# Patient Record
Sex: MALE | Race: WHITE | NOT HISPANIC OR LATINO
[De-identification: names, ages, dates, MRNs, and addresses within clinical notes are randomized per-mention and may not be internally consistent; named-entity substitution may affect disease eponyms.]

---

## 2022-11-18 PROBLEM — Z00.00 ENCOUNTER FOR PREVENTIVE HEALTH EXAMINATION: Status: ACTIVE | Noted: 2022-11-18

## 2022-11-29 ENCOUNTER — NON-APPOINTMENT (OUTPATIENT)
Age: 67
End: 2022-11-29

## 2022-11-29 ENCOUNTER — TRANSCRIPTION ENCOUNTER (OUTPATIENT)
Age: 67
End: 2022-11-29

## 2022-11-29 ENCOUNTER — APPOINTMENT (OUTPATIENT)
Dept: SURGERY | Facility: CLINIC | Age: 67
End: 2022-11-29

## 2022-11-29 VITALS
OXYGEN SATURATION: 99 % | DIASTOLIC BLOOD PRESSURE: 86 MMHG | RESPIRATION RATE: 16 BRPM | TEMPERATURE: 97.1 F | HEART RATE: 67 BPM | SYSTOLIC BLOOD PRESSURE: 139 MMHG

## 2022-11-29 VITALS — BODY MASS INDEX: 26.49 KG/M2 | HEIGHT: 65 IN | WEIGHT: 159 LBS

## 2022-11-29 DIAGNOSIS — I10 ESSENTIAL (PRIMARY) HYPERTENSION: ICD-10-CM

## 2022-11-29 DIAGNOSIS — Z85.46 PERSONAL HISTORY OF MALIGNANT NEOPLASM OF PROSTATE: ICD-10-CM

## 2022-11-29 DIAGNOSIS — E78.5 HYPERLIPIDEMIA, UNSPECIFIED: ICD-10-CM

## 2022-11-29 DIAGNOSIS — K21.9 GASTRO-ESOPHAGEAL REFLUX DISEASE W/OUT ESOPHAGITIS: ICD-10-CM

## 2022-11-29 DIAGNOSIS — I73.00 RAYNAUD'S SYNDROME W/OUT GANGRENE: ICD-10-CM

## 2022-11-29 DIAGNOSIS — K40.20 BILATERAL INGUINAL HERNIA, W/OUT OBSTRUCTION OR GANGRENE, NOT SPECIFIED AS RECURRENT: ICD-10-CM

## 2022-11-29 PROCEDURE — 99204 OFFICE O/P NEW MOD 45 MIN: CPT

## 2022-11-29 RX ORDER — ROSUVASTATIN CALCIUM 20 MG/1
20 TABLET, FILM COATED ORAL
Refills: 0 | Status: ACTIVE | COMMUNITY

## 2022-11-29 RX ORDER — MONTELUKAST 10 MG/1
10 TABLET, FILM COATED ORAL
Refills: 0 | Status: ACTIVE | COMMUNITY

## 2022-11-29 RX ORDER — RABEPRAZOLE SODIUM 20 MG/1
20 TABLET, DELAYED RELEASE ORAL
Refills: 0 | Status: ACTIVE | COMMUNITY

## 2022-11-29 RX ORDER — QUINIDINE SULFATE 200 MG
TABLET ORAL
Refills: 0 | Status: ACTIVE | COMMUNITY

## 2022-11-29 RX ORDER — LEVOCETIRIZINE DIHYDROCHLORIDE 5 MG/1
5 TABLET ORAL
Refills: 0 | Status: ACTIVE | COMMUNITY

## 2022-11-29 RX ORDER — AMLODIPINE BESYLATE AND BENAZEPRIL HYDROCHLORIDE 10; 20 MG/1; MG/1
10-20 CAPSULE ORAL
Refills: 0 | Status: ACTIVE | COMMUNITY

## 2022-11-29 NOTE — PHYSICAL EXAM
[Normal Breath Sounds] : Normal breath sounds [Normal Heart Sounds] : normal heart sounds [No Rash or Lesion] : No rash or lesion [de-identified] : healthy appearing [de-identified] : SAMSON KIMBALL [de-identified] : Supple [de-identified] : Bilateral groin bulges reducible on exam.  Right greater than left. [de-identified] : Genitalia appropriate for age and gender, 2 testicles in scrotal sac.

## 2022-11-29 NOTE — PLAN
[FreeTextEntry1] : Patient was counseled on bilateral open inguinal hernia repair without mesh using the Shouldice technique.  Risks benefits and complications were discussed with him at length.  Questions were answered to his satisfaction.  And he is agreeable to proceed.

## 2023-01-03 ENCOUNTER — NON-APPOINTMENT (OUTPATIENT)
Age: 68
End: 2023-01-03

## 2023-01-16 ENCOUNTER — TRANSCRIPTION ENCOUNTER (OUTPATIENT)
Age: 68
End: 2023-01-16

## 2023-01-16 LAB — SARS-COV-2 N GENE NPH QL NAA+PROBE: NOT DETECTED

## 2023-01-17 ENCOUNTER — TRANSCRIPTION ENCOUNTER (OUTPATIENT)
Age: 68
End: 2023-01-17

## 2023-01-17 ENCOUNTER — APPOINTMENT (OUTPATIENT)
Dept: SURGERY | Facility: HOSPITAL | Age: 68
End: 2023-01-17

## 2023-01-17 ENCOUNTER — OUTPATIENT (OUTPATIENT)
Dept: OUTPATIENT SERVICES | Facility: HOSPITAL | Age: 68
LOS: 1 days | End: 2023-01-17
Payer: COMMERCIAL

## 2023-01-17 VITALS
HEART RATE: 62 BPM | DIASTOLIC BLOOD PRESSURE: 80 MMHG | OXYGEN SATURATION: 98 % | TEMPERATURE: 98 F | RESPIRATION RATE: 15 BRPM | HEIGHT: 68 IN | WEIGHT: 145.28 LBS | SYSTOLIC BLOOD PRESSURE: 142 MMHG

## 2023-01-17 VITALS
SYSTOLIC BLOOD PRESSURE: 132 MMHG | HEART RATE: 69 BPM | RESPIRATION RATE: 16 BRPM | TEMPERATURE: 99 F | OXYGEN SATURATION: 97 % | DIASTOLIC BLOOD PRESSURE: 75 MMHG

## 2023-01-17 DIAGNOSIS — K40.21 BILATERAL INGUINAL HERNIA, WITHOUT OBSTRUCTION OR GANGRENE, RECURRENT: ICD-10-CM

## 2023-01-17 DIAGNOSIS — Z98.890 OTHER SPECIFIED POSTPROCEDURAL STATES: Chronic | ICD-10-CM

## 2023-01-17 PROCEDURE — 49505 PRP I/HERN INIT REDUC >5 YR: CPT | Mod: 50

## 2023-01-17 PROCEDURE — 49507 PRP I/HERN INIT BLOCK >5 YR: CPT | Mod: 50

## 2023-01-17 RX ORDER — HYDROMORPHONE HYDROCHLORIDE 2 MG/ML
1 INJECTION INTRAMUSCULAR; INTRAVENOUS; SUBCUTANEOUS
Refills: 0 | Status: DISCONTINUED | OUTPATIENT
Start: 2023-01-17 | End: 2023-01-17

## 2023-01-17 RX ORDER — ONDANSETRON 8 MG/1
4 TABLET, FILM COATED ORAL ONCE
Refills: 0 | Status: DISCONTINUED | OUTPATIENT
Start: 2023-01-17 | End: 2023-01-17

## 2023-01-17 RX ORDER — SODIUM CHLORIDE 9 MG/ML
1000 INJECTION, SOLUTION INTRAVENOUS
Refills: 0 | Status: DISCONTINUED | OUTPATIENT
Start: 2023-01-17 | End: 2023-01-17

## 2023-01-17 RX ORDER — HYDROMORPHONE HYDROCHLORIDE 2 MG/ML
0.5 INJECTION INTRAMUSCULAR; INTRAVENOUS; SUBCUTANEOUS
Refills: 0 | Status: DISCONTINUED | OUTPATIENT
Start: 2023-01-17 | End: 2023-01-17

## 2023-01-17 RX ORDER — OXYCODONE HYDROCHLORIDE 5 MG/1
1 TABLET ORAL
Qty: 6 | Refills: 0
Start: 2023-01-17

## 2023-01-17 RX ADMIN — SODIUM CHLORIDE 50 MILLILITER(S): 9 INJECTION, SOLUTION INTRAVENOUS at 09:59

## 2023-01-17 NOTE — BRIEF OPERATIVE NOTE - NSICDXBRIEFPREOP_GEN_ALL_CORE_FT
PRE-OP DIAGNOSIS:  Bilateral inguinal hernia without obstruction or gangrene 17-Jan-2023 14:54:17  Rosa Hogue

## 2023-01-17 NOTE — ASU PATIENT PROFILE, ADULT - NSICDXPASTMEDICALHX_GEN_ALL_CORE_FT
PAST MEDICAL HISTORY:  Bilateral inguinal hernia, without obstruction or gangrene, recurrent     GERD (gastroesophageal reflux disease)     HLD (hyperlipidemia)     HTN (hypertension)

## 2023-01-17 NOTE — BRIEF OPERATIVE NOTE - NSICDXBRIEFPROCEDURE_GEN_ALL_CORE_FT
PROCEDURES:  Repair, hernia, inguinal, reducible, bilateral, without history of prior repair, age older than 5 years 17-Jan-2023 14:54:41  Rosa Hogue

## 2023-01-17 NOTE — ASU PATIENT PROFILE, ADULT - FALL HARM RISK - HARM RISK INTERVENTIONS

## 2023-01-17 NOTE — BRIEF OPERATIVE NOTE - NSICDXBRIEFPOSTOP_GEN_ALL_CORE_FT
POST-OP DIAGNOSIS:  Bilateral inguinal hernia without obstruction or gangrene 17-Jan-2023 14:54:21  Rosa Hogue

## 2023-01-17 NOTE — ASU DISCHARGE PLAN (ADULT/PEDIATRIC) - NS MD DC FALL RISK RISK
For information on Fall & Injury Prevention, visit: [email or web address] OR  [email or web address] OR  https://www.cdc.gov/steadi/patient.html

## 2023-01-17 NOTE — ASU DISCHARGE PLAN (ADULT/PEDIATRIC) - CARE PROVIDER_API CALL
Anuradha Lofton)  Surgery  [address] [place] [address]  Phone: [phone]  Fax: [phone]  Follow Up Time: 2 weeks

## 2023-01-17 NOTE — ASU DISCHARGE PLAN (ADULT/PEDIATRIC) - ASU DC SPECIAL INSTRUCTIONSFT
Post-operative Instructions  	Wound dressing- You have a transparent/purple liquid dressing (called Dermabond) over your surgical incision. Do NOT remove the Dermabond. IN a few days, the Dermabond will fall off (or peel off) on its own.    	Showering/Bathing- You may shower over the dressing the very next day after surgery.  Allow the water to run over the dressing, but do not scrub the area.  Do not sit in a bathtub or swimming pool for at least one week after surgery.    	Activity level- You may resume most normal daily activity as tolerated, but avoid strenuous activities such as aerobics, jogging, exercising or heavy lifting for at least 1 week after surgery.  You may return to work in 1-2 days after surgery. Walking as tolerated.     	Pain Medication- Please take tylenol every 6 hours, and stagger with ibuprofen every 6 hours that you take in a pain medication every 3 hours. This will allow you to alternate medications for more consistent pain control. For example: take a dose of tylenol at 8 am, then take a dose of ibuprofen at 11 am, then take a dose of tylenol at 2pm, and continue as needed. You may take the prescribed pain medicine as needed for breakthrough pain.     	Follow-up Appointment- Please call the office to schedule your post-operative appointment which should be approximately 10 days after your surgery.     	Bruising/Bleeding/Swelling- It is normal for there to be some bruising and swelling at the site. Some discomfort at the surgical site is expected.  If your symptoms seem excessive, or if you have any question or concerns, please call the office.      Anesthesia Precautions:  For the next 12 hours do not:   •	drive a car,  •	drink alcohol, beer, or wine,   •	make important personal or business decisions  Diet:   •	Progress diet slowly unless otherwise indicated    Physician Notification  •	Any Prescription issues  •	Bleeding that does not stop  •	Persistent nausea and vomiting  •	Pain not relieved by medications  •	Fever greater than 101®F  •	Inability to tolerate liquids or foods  •	Unable to urinate after 8 hours    Follow Up Care:  •	In the event that you develop a complication and you are unable to reach your own physician, you may contact:  [phone] or go to the nearest Emergency Room.   •	Please call your surgeon to schedule your follow up appointment

## 2023-01-23 PROBLEM — K40.21 BILATERAL INGUINAL HERNIA, WITHOUT OBSTRUCTION OR GANGRENE, RECURRENT: Chronic | Status: ACTIVE | Noted: 2022-12-28

## 2023-01-23 PROBLEM — K21.9 GASTRO-ESOPHAGEAL REFLUX DISEASE WITHOUT ESOPHAGITIS: Chronic | Status: ACTIVE | Noted: 2022-12-28

## 2023-01-23 PROBLEM — E78.5 HYPERLIPIDEMIA, UNSPECIFIED: Chronic | Status: ACTIVE | Noted: 2022-12-28

## 2023-01-23 PROBLEM — I10 ESSENTIAL (PRIMARY) HYPERTENSION: Chronic | Status: ACTIVE | Noted: 2022-12-28

## 2023-02-01 ENCOUNTER — APPOINTMENT (OUTPATIENT)
Dept: SURGERY | Facility: CLINIC | Age: 68
End: 2023-02-01
Payer: COMMERCIAL

## 2023-02-01 DIAGNOSIS — Z09 ENCOUNTER FOR FOLLOW-UP EXAMINATION AFTER COMPLETED TREATMENT FOR CONDITIONS OTHER THAN MALIGNANT NEOPLASM: ICD-10-CM

## 2023-02-01 PROCEDURE — 99024 POSTOP FOLLOW-UP VISIT: CPT

## 2023-03-13 PROBLEM — Z09 S/P BILATERAL INGUINAL HERNIA REPAIR, FOLLOW-UP EXAM: Status: ACTIVE | Noted: 2023-03-13

## 2023-03-13 NOTE — REVIEW OF SYSTEMS
[Negative] : Heme/Lymph [FreeTextEntry7] : Status post open bilateral inguinal hernia repair Shouldice technique doing well.

## 2023-03-13 NOTE — PHYSICAL EXAM
[de-identified] : Patient demonstrates bilateral groin scars healing nicely without any obvious signs of infection.  Bilateral healing ridge are present.  Patient reports adequate sensation bilaterally, with some appropriate decrease in sensation which is appropriate with postop findings. [de-identified] : Genitalia appear to be appropriate for postop findings.

## 2023-03-13 NOTE — PLAN
[FreeTextEntry1] : 67-year-old male status post open bilateral inguinal hernia repair Shouldice technique doing well.  Recommended to increase his level of activity in an incremental fashion.  He has been reassured that both repairs appear to be structurally sound.  He was reassured that the healing ridge will flatten out in approximately 2- 4 months.  And a lot of his sensation will definitely come back if not 100%.  Follow-up in approximately 4 to 6 months.

## 2023-03-13 NOTE — REASON FOR VISIT
[Home] : at home, [unfilled] , at the time of the visit. [Medical Office: ([facility])___] : at the medical office located in  [Patient] : the patient [Self] : self [Post Op: _________] : a [unfilled] post op visit [FreeTextEntry1] : Status post open bilateral inguinal hernia repair Shouldice mesh free doing well.

## 2025-04-09 ENCOUNTER — APPOINTMENT (OUTPATIENT)
Dept: SURGERY | Facility: CLINIC | Age: 70
End: 2025-04-09
Payer: MEDICARE

## 2025-04-09 DIAGNOSIS — Z09 ENCOUNTER FOR FOLLOW-UP EXAMINATION AFTER COMPLETED TREATMENT FOR CONDITIONS OTHER THAN MALIGNANT NEOPLASM: ICD-10-CM

## 2025-04-09 DIAGNOSIS — Z87.19 ENCOUNTER FOR FOLLOW-UP EXAMINATION AFTER COMPLETED TREATMENT FOR CONDITIONS OTHER THAN MALIGNANT NEOPLASM: ICD-10-CM

## 2025-04-09 DIAGNOSIS — L02.92 FURUNCLE, UNSPECIFIED: ICD-10-CM

## 2025-04-09 DIAGNOSIS — L02.93 FURUNCLE, UNSPECIFIED: ICD-10-CM

## 2025-04-09 PROCEDURE — 99205 OFFICE O/P NEW HI 60 MIN: CPT

## (undated) DEVICE — POSITIONER FOAM HEAD CRADLE (PINK)

## (undated) DEVICE — SPECIMEN CONTAINER 100ML

## (undated) DEVICE — DRSG DERMABOND 0.7ML

## (undated) DEVICE — GLV 6.5 PROTEXIS (WHITE)

## (undated) DEVICE — WARMING BLANKET UPPER ADULT

## (undated) DEVICE — ELCTR BOVIE PENCIL SMOKE EVACUATION

## (undated) DEVICE — DRAIN PENROSE .25" X 12" SILICONE

## (undated) DEVICE — SUT SURGIPRO 2-0 30" V-20

## (undated) DEVICE — SUT POLYSORB 3-0 30" V-20 UNDYED

## (undated) DEVICE — DRAPE LIGHT HANDLE COVER (GREEN)

## (undated) DEVICE — SUT CHROMIC 2-0 54" REEL

## (undated) DEVICE — SOL IRR POUR H2O 1500ML

## (undated) DEVICE — ELCTR STRYKER NEPTUNE SMOKE EVACUATION PENCIL (GREEN)

## (undated) DEVICE — GLV 7.5 ULTRAFREE MAX

## (undated) DEVICE — SUT MONOCRYL 4-0 27" PS-2 UNDYED

## (undated) DEVICE — PREP CHLORAPREP HI-LITE ORANGE 26ML

## (undated) DEVICE — CANISTER SUCTION LID GUARD 3000CC

## (undated) DEVICE — SUT POLYSORB 2-0 18" V-20

## (undated) DEVICE — VENODYNE/SCD SLEEVE CALF MEDIUM

## (undated) DEVICE — LAP PAD 18 X 18"

## (undated) DEVICE — SUT STEEL CT-1 8X18IN SILVER

## (undated) DEVICE — PACK MINOR

## (undated) DEVICE — SOL IRR POUR NS 0.9% 500ML

## (undated) DEVICE — SOLIDIFIER CANN EXPRESS 3K